# Patient Record
Sex: MALE | Race: WHITE | NOT HISPANIC OR LATINO | URBAN - METROPOLITAN AREA
[De-identification: names, ages, dates, MRNs, and addresses within clinical notes are randomized per-mention and may not be internally consistent; named-entity substitution may affect disease eponyms.]

---

## 2021-01-07 ENCOUNTER — INPATIENT (INPATIENT)
Facility: HOSPITAL | Age: 76
LOS: 0 days | Discharge: HOME | DRG: 287 | End: 2021-01-08
Attending: STUDENT IN AN ORGANIZED HEALTH CARE EDUCATION/TRAINING PROGRAM | Admitting: STUDENT IN AN ORGANIZED HEALTH CARE EDUCATION/TRAINING PROGRAM
Payer: COMMERCIAL

## 2021-01-07 VITALS
RESPIRATION RATE: 18 BRPM | OXYGEN SATURATION: 100 % | TEMPERATURE: 98 F | SYSTOLIC BLOOD PRESSURE: 161 MMHG | WEIGHT: 154.98 LBS | HEART RATE: 67 BPM | HEIGHT: 68 IN | DIASTOLIC BLOOD PRESSURE: 77 MMHG

## 2021-01-07 DIAGNOSIS — E78.5 HYPERLIPIDEMIA, UNSPECIFIED: ICD-10-CM

## 2021-01-07 DIAGNOSIS — N40.0 BENIGN PROSTATIC HYPERPLASIA WITHOUT LOWER URINARY TRACT SYMPTOMS: ICD-10-CM

## 2021-01-07 DIAGNOSIS — R07.9 CHEST PAIN, UNSPECIFIED: ICD-10-CM

## 2021-01-07 DIAGNOSIS — I20.0 UNSTABLE ANGINA: ICD-10-CM

## 2021-01-07 LAB
ALBUMIN SERPL ELPH-MCNC: 4.7 G/DL — SIGNIFICANT CHANGE UP (ref 3.3–5)
ALP SERPL-CCNC: 87 U/L — SIGNIFICANT CHANGE UP (ref 40–120)
ALT FLD-CCNC: 10 U/L — SIGNIFICANT CHANGE UP (ref 10–45)
ANION GAP SERPL CALC-SCNC: 10 MMOL/L — SIGNIFICANT CHANGE UP (ref 5–17)
APTT BLD: 30.2 SEC — SIGNIFICANT CHANGE UP (ref 27.5–35.5)
AST SERPL-CCNC: 21 U/L — SIGNIFICANT CHANGE UP (ref 10–40)
BASOPHILS # BLD AUTO: 0.06 K/UL — SIGNIFICANT CHANGE UP (ref 0–0.2)
BASOPHILS NFR BLD AUTO: 0.9 % — SIGNIFICANT CHANGE UP (ref 0–2)
BILIRUB SERPL-MCNC: 0.7 MG/DL — SIGNIFICANT CHANGE UP (ref 0.2–1.2)
BUN SERPL-MCNC: 16 MG/DL — SIGNIFICANT CHANGE UP (ref 7–23)
CALCIUM SERPL-MCNC: 9.7 MG/DL — SIGNIFICANT CHANGE UP (ref 8.4–10.5)
CHLORIDE SERPL-SCNC: 101 MMOL/L — SIGNIFICANT CHANGE UP (ref 96–108)
CO2 SERPL-SCNC: 28 MMOL/L — SIGNIFICANT CHANGE UP (ref 22–31)
CREAT SERPL-MCNC: 0.87 MG/DL — SIGNIFICANT CHANGE UP (ref 0.5–1.3)
EOSINOPHIL # BLD AUTO: 0.24 K/UL — SIGNIFICANT CHANGE UP (ref 0–0.5)
EOSINOPHIL NFR BLD AUTO: 3.7 % — SIGNIFICANT CHANGE UP (ref 0–6)
GLUCOSE SERPL-MCNC: 114 MG/DL — HIGH (ref 70–99)
HCT VFR BLD CALC: 40.5 % — SIGNIFICANT CHANGE UP (ref 39–50)
HGB BLD-MCNC: 12.9 G/DL — LOW (ref 13–17)
IMM GRANULOCYTES NFR BLD AUTO: 0.3 % — SIGNIFICANT CHANGE UP (ref 0–1.5)
INR BLD: 1.04 RATIO — SIGNIFICANT CHANGE UP (ref 0.88–1.16)
LYMPHOCYTES # BLD AUTO: 1.85 K/UL — SIGNIFICANT CHANGE UP (ref 1–3.3)
LYMPHOCYTES # BLD AUTO: 28.7 % — SIGNIFICANT CHANGE UP (ref 13–44)
MCHC RBC-ENTMCNC: 30.1 PG — SIGNIFICANT CHANGE UP (ref 27–34)
MCHC RBC-ENTMCNC: 31.9 GM/DL — LOW (ref 32–36)
MCV RBC AUTO: 94.6 FL — SIGNIFICANT CHANGE UP (ref 80–100)
MONOCYTES # BLD AUTO: 0.5 K/UL — SIGNIFICANT CHANGE UP (ref 0–0.9)
MONOCYTES NFR BLD AUTO: 7.8 % — SIGNIFICANT CHANGE UP (ref 2–14)
NEUTROPHILS # BLD AUTO: 3.78 K/UL — SIGNIFICANT CHANGE UP (ref 1.8–7.4)
NEUTROPHILS NFR BLD AUTO: 58.6 % — SIGNIFICANT CHANGE UP (ref 43–77)
NRBC # BLD: 0 /100 WBCS — SIGNIFICANT CHANGE UP (ref 0–0)
PLATELET # BLD AUTO: 234 K/UL — SIGNIFICANT CHANGE UP (ref 150–400)
POTASSIUM SERPL-MCNC: 4.4 MMOL/L — SIGNIFICANT CHANGE UP (ref 3.5–5.3)
POTASSIUM SERPL-SCNC: 4.4 MMOL/L — SIGNIFICANT CHANGE UP (ref 3.5–5.3)
PROT SERPL-MCNC: 7.8 G/DL — SIGNIFICANT CHANGE UP (ref 6–8.3)
PROTHROM AB SERPL-ACNC: 12.4 SEC — SIGNIFICANT CHANGE UP (ref 10.6–13.6)
RBC # BLD: 4.28 M/UL — SIGNIFICANT CHANGE UP (ref 4.2–5.8)
RBC # FLD: 12.9 % — SIGNIFICANT CHANGE UP (ref 10.3–14.5)
SARS-COV-2 RNA SPEC QL NAA+PROBE: SIGNIFICANT CHANGE UP
SODIUM SERPL-SCNC: 139 MMOL/L — SIGNIFICANT CHANGE UP (ref 135–145)
TROPONIN T, HIGH SENSITIVITY RESULT: 8 NG/L — SIGNIFICANT CHANGE UP (ref 0–51)
TROPONIN T, HIGH SENSITIVITY RESULT: 9 NG/L — SIGNIFICANT CHANGE UP (ref 0–51)
WBC # BLD: 6.45 K/UL — SIGNIFICANT CHANGE UP (ref 3.8–10.5)
WBC # FLD AUTO: 6.45 K/UL — SIGNIFICANT CHANGE UP (ref 3.8–10.5)

## 2021-01-07 PROCEDURE — 99285 EMERGENCY DEPT VISIT HI MDM: CPT | Mod: GC

## 2021-01-07 PROCEDURE — 93010 ELECTROCARDIOGRAM REPORT: CPT | Mod: GC

## 2021-01-07 PROCEDURE — 71045 X-RAY EXAM CHEST 1 VIEW: CPT | Mod: 26

## 2021-01-07 PROCEDURE — 99223 1ST HOSP IP/OBS HIGH 75: CPT

## 2021-01-07 RX ORDER — ATORVASTATIN CALCIUM 80 MG/1
1 TABLET, FILM COATED ORAL
Qty: 0 | Refills: 0 | DISCHARGE

## 2021-01-07 RX ORDER — METOPROLOL TARTRATE 50 MG
1 TABLET ORAL
Qty: 0 | Refills: 0 | DISCHARGE

## 2021-01-07 RX ORDER — ASPIRIN/CALCIUM CARB/MAGNESIUM 324 MG
1 TABLET ORAL
Qty: 0 | Refills: 0 | DISCHARGE

## 2021-01-07 RX ORDER — ENOXAPARIN SODIUM 100 MG/ML
40 INJECTION SUBCUTANEOUS DAILY
Refills: 0 | Status: DISCONTINUED | OUTPATIENT
Start: 2021-01-07 | End: 2021-01-08

## 2021-01-07 RX ORDER — ATORVASTATIN CALCIUM 80 MG/1
10 TABLET, FILM COATED ORAL AT BEDTIME
Refills: 0 | Status: DISCONTINUED | OUTPATIENT
Start: 2021-01-07 | End: 2021-01-08

## 2021-01-07 RX ORDER — METOPROLOL TARTRATE 50 MG
25 TABLET ORAL DAILY
Refills: 0 | Status: DISCONTINUED | OUTPATIENT
Start: 2021-01-08 | End: 2021-01-08

## 2021-01-07 RX ORDER — TAMSULOSIN HYDROCHLORIDE 0.4 MG/1
0.4 CAPSULE ORAL AT BEDTIME
Refills: 0 | Status: DISCONTINUED | OUTPATIENT
Start: 2021-01-07 | End: 2021-01-08

## 2021-01-07 RX ORDER — ASPIRIN/CALCIUM CARB/MAGNESIUM 324 MG
81 TABLET ORAL DAILY
Refills: 0 | Status: DISCONTINUED | OUTPATIENT
Start: 2021-01-07 | End: 2021-01-08

## 2021-01-07 NOTE — ED ADULT NURSE NOTE - OBJECTIVE STATEMENT
75 year old male presented in w/c from triage states he was told by cardiologist to go to ER last night after falling stress test, Patient A/O x4 hx Hyperlipidemia VSS afebrile, CM in place, skin W/P/M denies chest pain at this time, Lung sounds clear no resp distress, able to move all extremities with no dsitress, no swelling noted

## 2021-01-07 NOTE — ED ADULT NURSE REASSESSMENT NOTE - NS ED NURSE REASSESS COMMENT FT1
Report received from Indira KUHN.  Pt is resting comfortably, no complaints at this time.  VSS, skin w/d/i.  Safety and comfort maintained.  Bed in lowest position, and callbell within reach.  Pending bed upstairs.  Will continue to monitor.

## 2021-01-07 NOTE — ED ADULT NURSE NOTE - CHIEF COMPLAINT QUOTE
mild sob, abnormal stress test yesterday showing blockages), sent in for Cardiac Cath by Cardiologist (MD Winston Escalera - Counts include 234 beds at the [facility])

## 2021-01-07 NOTE — ED PROVIDER NOTE - CLINICAL SUMMARY MEDICAL DECISION MAKING FREE TEXT BOX
[employer]eris-PGY2: 75 year old male with PMH HLD, BPH presents with intermittent chest pain x 1 week. Pt reports intermittent left sided chest pain described as squeezing, radiating to left shoulder, and lasting "a few minutes" when present. Denies any fevers, abdominal pain, nausea, vomiting, diarrhea, leg swelling, numbness, weakness, sweating, lightheadedness, or vision change. Pt states he exercises often, denies any chest pain or shortness of breath with exertion. Pt states he had stress test/echo yesterday with cardiologist Winston Escalera ([facility]) showing "blockage" and was advised to go to the Emergency Department for cardiac catheterization. Denies any complaints at this time and states he feels well. Will obtain labs, imaging, and admission for further evaluation/management.

## 2021-01-07 NOTE — ED ADULT TRIAGE NOTE - CHIEF COMPLAINT QUOTE
mild sob, abnormal stress test yesterday showing blockages), sent in for Cardiac Cath by Cardiologist (MD Winston Escalera - [facility])

## 2021-01-07 NOTE — H&P ADULT - HISTORY OF PRESENT ILLNESS
75yoM w/ PMHx significant for HLD, BPH, who presents w/ complaints of intermittent chest pain (left chest wall radiating to left shoulder, described as "squeezing) over the past week, after being evaluated as O/P by his cardiologist, Dr. Winston Escalera, who performed stress testing yesterday revealing concern for ischemia/blockage, currently w/o complaints of active chest pain, palpitations, SOB/CHOWDHURY, orthopnea, or LE edema, admitted for planned cardiac catheterization. Per the patient symptoms occurs intermittently, but not coinciding with activity, and resolving after a few minutes. He typically exercises often without complaints.     ED Presenting Vitals: 98.1F, 67bpm, 161/77, 18br/m, 100% on RA

## 2021-01-07 NOTE — H&P ADULT - ASSESSMENT
75yoM w/ PMHx significant for HLD, BPH, who presents w/ complaints of intermittent chest pain (left chest wall radiating to left shoulder, described as "squeezing) over the past week, after being evaluated as O/P by his cardiologist, Dr. Winston Escalera, who performed stress testing yesterday revealing concern for ischemia/blockage, currently w/o complaints.

## 2021-01-07 NOTE — ED PROVIDER NOTE - OBJECTIVE STATEMENT
75 year old male with PMH HLD, BPH presents with intermittent chest pain x 1 week. Stress test.     TO BE COMPLETED 75 year old male with PMH HLD, BPH presents with intermittent chest pain x 1 week. Pt reports intermittent left sided chest pain described as squeezing, radiating to left shoulder, and lasting "a few minutes" when present. Denies any fevers, abdominal pain, nausea, vomiting, diarrhea, leg swelling, numbness, weakness, sweating, lightheadedness, or vision change. Pt states he exercises often, denies any chest pain or shortness of breath with exertion. Pt states he had stress test/echo yesterday with cardiologist Winston Escalera ([facility]) showing "blockage" and was advised to go to the Emergency Department for cardiac catheterization. Denies any complaints at this time and states he feels well.

## 2021-01-07 NOTE — ED PROVIDER NOTE - PHYSICAL EXAMINATION
CONSTITUTIONAL: non-toxic, well appearing  SKIN: no rash, no petechiae.  EYES:  pink conjunctiva, anicteric  NECK: Supple; no meningismus, no JVD  CARD: RRR, no murmurs, equal radial pulses bilaterally 2+  RESP: CTAB, no respiratory distress  ABD: Soft, non-tender, non-distended, no peritoneal signs  EXT: Normal ROM x4. No edema. No calf tenderness  NEURO: Alert, oriented. Neuro exam nonfocal.  PSYCH: Cooperative, appropriate.

## 2021-01-07 NOTE — H&P ADULT - NSHPPHYSICALEXAM_GEN_ALL_CORE
Vital Signs Last 24 Hrs  T(F): 97.8 (07 Jan 2021 14:44), Max: 98.1 (07 Jan 2021 10:56)  HR: 62 (07 Jan 2021 15:06) (62 - 70)  BP: 106/67 (07 Jan 2021 15:06) (106/67 - 161/77)  RR: 18 (07 Jan 2021 15:06) (16 - 18)  SpO2: 100% (07 Jan 2021 15:06) (99% - 100%) Vital Signs Last 24 Hrs  T(F): 97.8 (07 Jan 2021 14:44), Max: 98.1 (07 Jan 2021 10:56)  HR: 62 (07 Jan 2021 15:06) (62 - 70)  BP: 106/67 (07 Jan 2021 15:06) (106/67 - 161/77)  RR: 18 (07 Jan 2021 15:06) (16 - 18)  SpO2: 100% (07 Jan 2021 15:06) (99% - 100%)    GEN: elderly man, laying in stretcher in NAD  PSYCH: A&Ox3, mood and affect appear appropriate   SKIN: intact, no e/o rash  NEURO: no focal neurologic deficits appreciated  EYES: PERRL, anicteric  HEAD: NC, AT  NECK: supple  RESPI: no accessory muscle use, B/L air entry, CTAB   CARDIO: regular rate/rhythm, no LE edema B/L  ABD: soft, NT, ND, +BS  EXT: patient able to move all extremities spontaneously  VASC: peripheral pulses palpated

## 2021-01-07 NOTE — H&P ADULT - NEGATIVE ENMT SYMPTOMS
no sinus symptoms/no nasal congestion/no nasal discharge/no nasal obstruction/no throat pain/no dysphagia

## 2021-01-07 NOTE — H&P ADULT - PROBLEM SELECTOR PLAN 1
- currently w/o CP or CP equivalents, ECG w/o VERNELL, hsTrop neg x2  - monitor on telemetry, monitor hemodynamics closely  - if CP returns, please obtain repeat ECG and cardiac enzymes  - per outpatient cardiologist, plan for inpatient cardiac catheterization given abnormal stress testing  - continue home ASA, beta-blocker - currently w/o CP or CP equivalents, ECG w/o VERNELL, hsTrop neg x2  - monitor on telemetry, monitor hemodynamics closely  - if CP returns, please obtain repeat ECG and cardiac enzymes  - per outpatient cardiologist, plan for inpatient cardiac catheterization given abnormal stress testing -- attending of record to contact inpatient cardiology  - continue home ASA, beta-blocker

## 2021-01-07 NOTE — ED ADULT NURSE REASSESSMENT NOTE - NS ED NURSE REASSESS COMMENT FT1
1440: patient ambulatory to bathroom with no distress, VSS CM in place, plan for cath lab tomorrow, plan of care discussed in detail, report given to receiving RN DC

## 2021-01-07 NOTE — H&P ADULT - NSHPLABSRESULTS_GEN_ALL_CORE
Labs and imaging data obtained by the ED personally reviewed.                        12.9   6.45  )-----------( 234      ( 07 Jan 2021 11:47 )             40.5   01-07  139  |  101  |  16  ----------------------------<  114<H>  4.4   |  28  |  0.87    Ca    9.7      07 Jan 2021 11:47    TPro  7.8  /  Alb  4.7  /  TBili  0.7  /  DBili  x   /  AST  21  /  ALT  10  /  AlkPhos  87  01-07    PT/INR - ( 07 Jan 2021 11:47 )   PT: 12.4 sec;   INR: 1.04 ratio     PTT - ( 07 Jan 2021 11:47 )  PTT:30.2 sec    hsTrop = 9-->8    COVID 19 PCR negative    CXR as reported: Clear lungs.    ECG: NSR, QTc 475

## 2021-01-07 NOTE — ED PROVIDER NOTE - NS ED ROS FT
Constitutional: No fever or chills  Eyes: No visual changes  HEENT: No throat pain  CV: Chest pain  Resp: No SOB no cough  GI: No abd pain, nausea or vomiting  : No dysuria  MSK: No musculoskeletal pain  Skin: No rash  Neuro: No headache

## 2021-01-07 NOTE — ED PROVIDER NOTE - ATTENDING CONTRIBUTION TO CARE
75M, hld, bp, with intermittent cp x 1 week, "squeezing," rad to L shoulder, non-exertional, non-pleuritic. Pt was seen by cardiologist, had stress performed and was referred to ED. No active sx currently.    PE: NAD, NCAT, MMM, Trachea midline, Normal conjunctiva, lungs CTAB, S1/S2 RRR, Normal perfusion, 2+ radial pulses bilat, Abdomen Soft, NTND, No rebound/guarding, No LE edema, No deformity of extremities, No rashes,  No focal motor or sensory deficits.     Will check labs. EKG. Admit for further cardiac w/u. - Jose Lee MD

## 2021-01-08 ENCOUNTER — TRANSCRIPTION ENCOUNTER (OUTPATIENT)
Age: 76
End: 2021-01-08

## 2021-01-08 VITALS
RESPIRATION RATE: 18 BRPM | HEART RATE: 73 BPM | OXYGEN SATURATION: 95 % | DIASTOLIC BLOOD PRESSURE: 61 MMHG | SYSTOLIC BLOOD PRESSURE: 115 MMHG | TEMPERATURE: 98 F

## 2021-01-08 DIAGNOSIS — R07.9 CHEST PAIN, UNSPECIFIED: ICD-10-CM

## 2021-01-08 LAB
HCV AB S/CO SERPL IA: 0.07 S/CO — SIGNIFICANT CHANGE UP (ref 0–0.99)
HCV AB SERPL-IMP: SIGNIFICANT CHANGE UP

## 2021-01-08 PROCEDURE — 99152 MOD SED SAME PHYS/QHP 5/>YRS: CPT

## 2021-01-08 PROCEDURE — 93456 R HRT CORONARY ARTERY ANGIO: CPT | Mod: 26

## 2021-01-08 RX ORDER — TAMSULOSIN HYDROCHLORIDE 0.4 MG/1
1 CAPSULE ORAL
Qty: 0 | Refills: 0 | DISCHARGE

## 2021-01-08 RX ADMIN — ATORVASTATIN CALCIUM 10 MILLIGRAM(S): 80 TABLET, FILM COATED ORAL at 09:25

## 2021-01-08 RX ADMIN — Medication 81 MILLIGRAM(S): at 09:26

## 2021-01-08 RX ADMIN — TAMSULOSIN HYDROCHLORIDE 0.4 MILLIGRAM(S): 0.4 CAPSULE ORAL at 09:25

## 2021-01-08 RX ADMIN — Medication 25 MILLIGRAM(S): at 05:42

## 2021-01-08 NOTE — DISCHARGE NOTE PROVIDER - CARE PROVIDER_API CALL
Norris Noyola ()  Internal Medicine  [address] [place] [address], [facility] [address]  [place] [address]  Phone: [phone]  Fax: [phone]  Follow Up Time:

## 2021-01-08 NOTE — DISCHARGE NOTE PROVIDER - NSDCCPCAREPLAN_GEN_ALL_CORE_FT
PRINCIPAL DISCHARGE DIAGNOSIS  Diagnosis: Chest pain  Assessment and Plan of Treatment:   HOME CARE INSTRUCTIONS  For the next few days, avoid physical activities that bring on chest pain. Continue physical activities as directed.  Do not smoke.  Avoid drinking alcohol.   Only take over-the-counter or prescription medicine for pain, discomfort, or fever as directed by your caregiver.  Follow your caregiver's suggestions for further testing if your chest pain does not go away.  Keep any follow-up appointments you made. If you do not go to an appointment, you could develop lasting (chronic) problems with pain. If there is any problem keeping an appointment, you must call to reschedule.   SEEK MEDICAL CARE IF:  You think you are having problems from the medicine you are taking. Read your medicine instructions carefully.  Your chest pain does not go away, even after treatment.  You develop a rash with blisters on your chest.  SEEK IMMEDIATE MEDICAL CARE IF:  You have increased chest pain or pain that spreads to your arm, neck, jaw, back, or abdomen.   You develop shortness of breath, an increasing cough, or you are coughing up blood.  You have severe back or abdominal pain, feel nauseous, or vomit.  You develop severe weakness, fainting, or chills.  You have a fever.  THIS IS AN EMERGENCY. Do not wait to see if the pain will go away. Get medical help at once. Call your local emergency services (_____________________). Do not drive yourself to the hospital.        SECONDARY DISCHARGE DIAGNOSES  Diagnosis: HLD (hyperlipidemia)  Assessment and Plan of Treatment: c/w Atorvastatin

## 2021-01-08 NOTE — PROGRESS NOTE ADULT - PROBLEM SELECTOR PLAN 1
currently w/o CP or CP equivalents, ECG w/o VERNELL, hsTrop neg x2  monitor on telemetry, monitor hemodynamics closely  if CP returns, please obtain repeat ECG and cardiac enzymes  plan for inpatient cardiac catheterization given abnormal stress testing  continue home ASA, beta-blocker  appreciate cardio recs

## 2021-01-08 NOTE — CONSULT NOTE ADULT - PROBLEM SELECTOR RECOMMENDATION 9
-  -rule out significant cad   -abnormal stress test as outpatient  -cardiac cath today  -aspirin, lipitor, toprol xl as ordered    please obtain stress test report from outpatient cardiologist.    Norris Noyola D.O.  [phone]

## 2021-01-08 NOTE — CONSULT NOTE ADULT - SUBJECTIVE AND OBJECTIVE BOX
[facility] Cardiology Consult  _________________________    Patient is a 75y old  Male who presents with a chief complaint of CP, outpatient positive stress testing (07 Jan 2021 15:41)      HPI:  75yoM w/ PMHx significant for HLD, BPH, who presents w/ complaints of intermittent chest pain (left chest wall radiating to left shoulder, described as "squeezing) over the past week, after being evaluated as O/P by his cardiologist, Dr. Winston Escalera, who performed stress testing yesterday revealing concern for ischemia/blockage, currently w/o complaints of active chest pain, palpitations, SOB/CHOWDHURY, orthopnea, or LE edema, admitted for planned cardiac catheterization. Per the patient symptoms occurs intermittently, but not coinciding with activity, and resolving after a few minutes. He typically exercises often without complaints.       PAST MEDICAL & SURGICAL HISTORY:  BPH (benign prostatic hyperplasia)    HLD (hyperlipidemia)    No significant past surgical history        MEDICATIONS  (STANDING):  aspirin  chewable 81 milliGRAM(s) Oral daily  atorvastatin 10 milliGRAM(s) Oral at bedtime  enoxaparin Injectable 40 milliGRAM(s) SubCutaneous daily  metoprolol succinate ER 25 milliGRAM(s) Oral daily  tamsulosin 0.4 milliGRAM(s) Oral at bedtime    MEDICATIONS  (PRN):      Allergies    No Known Allergies    Intolerances        Social Histroy: Tobacco- , ETOH-, Illicit Drugs-    T(C): 36.8 (01-08-21 @ 04:40), Max: 36.8 (01-08-21 @ 04:40)  HR: 61 (01-08-21 @ 04:40) (60 - 70)  BP: 116/69 (01-08-21 @ 04:40) (106/67 - 161/77)  RR: 18 (01-08-21 @ 04:40) (15 - 18)  SpO2: 98% (01-08-21 @ 04:40) (95% - 100%)  I&O's Summary    07 Jan 2021 07:01  -  08 Jan 2021 07:00  --------------------------------------------------------  IN: 230 mL / OUT: 0 mL / NET: 230 mL        Review of Systems:  Constitutional: [ ] Fever [ ] Chills [ ] Fatigue [ ] Weight change   HEENT: [ ] Blurred vision [ ] Eye Pain [ ] Headache [ ] Runny nose [ ] Sore Throat   Respiratory: [ ] Cough [ ] Wheezing [ ] Shortness of breath  Cardiovascular: [x ] Chest Pain [ ] Palpitations [ ] CHOWDHURY [ ] PND [ ] Orthopnea  Gastrointestinal: [ ] Abdominal Pain [ ] Diarrhea [ ] Constipation [ ] Hemorrhoids [ ] Nausea [ ] Vomiting  Genitourinary: [ ] Nocturia [ ] Dysuria [ ] Incontinence  Extremities: [ ] Swelling [ ] Joint Pain  Neurologic: [ ] Focal deficit [ ] Paresthesias [ ] Syncope  Lymphatic: [ ] Swelling [ ] Lymphadenopathy   Skin: [ ] Rash [ ] Ecchymoses [ ] Wounds [ ] Lesions  Psychiatry: [ ] Depression [ ] Suicidal/Homicidal Ideation [ ] Anxiety [ ] Sleep Disturbances  [x ] 10 point review of systems is otherwise negative except as mentioned above            [ ]Unable to obtain    PHYSICAL EXAM:  GENERAL: Alert, NAD  NECK: Supple  CHEST/LUNG: Clear to auscultation bilaterally; No wheezes, rales, or rhonchi  HEART: S1 S2 normal, RRR,  No murmurs, rubs, or gallops  ABDOMEN: Soft, Nondistended  EXTREMITIES:  No LE edema.      LABS:                        12.9   6.45  )-----------( 234      ( 07 Jan 2021 11:47 )             40.5     01-07    139  |  101  |  16  ----------------------------<  114<H>  4.4   |  28  |  0.87    Ca    9.7      07 Jan 2021 11:47    TPro  7.8  /  Alb  4.7  /  TBili  0.7  /  DBili  x   /  AST  21  /  ALT  10  /  AlkPhos  87  01-07    PT/INR - ( 07 Jan 2021 11:47 )   PT: 12.4 sec;   INR: 1.04 ratio         PTT - ( 07 Jan 2021 11:47 )  PTT:30.2 sec              MEDICATIONS  (STANDING):  aspirin  chewable 81 milliGRAM(s) Oral daily  atorvastatin 10 milliGRAM(s) Oral at bedtime  enoxaparin Injectable 40 milliGRAM(s) SubCutaneous daily  metoprolol succinate ER 25 milliGRAM(s) Oral daily  tamsulosin 0.4 milliGRAM(s) Oral at bedtime    MEDICATIONS  (PRN):      Troponin T, High Sensitivity Result: 8 ng/L (01-07-21 @ 13:21)  Troponin T, High Sensitivity Result: 9 ng/L (01-07-21 @ 11:47)      RADIOLOGY & ADDITIONAL TESTS:    Cardiology testing:  EKG: sinus 69 bpm, pvc  Telemetry: sinus

## 2021-01-08 NOTE — DISCHARGE NOTE PROVIDER - HOSPITAL COURSE
75yoM w/ PMHx significant for HLD, BPH, who presents w/ complaint of intermittent chest pain  cath  with diffuse calcification LAD 30%  will d/c pt  home on ASA , Lipitor  and Toprol xl

## 2021-01-08 NOTE — PROGRESS NOTE ADULT - SUBJECTIVE AND OBJECTIVE BOX
Patient is a 75y old  Male who presents with a chief complaint of CP, outpatient positive stress testing (08 Jan 2021 09:25)      SUBJECTIVE / OVERNIGHT EVENTS:    Patient seen and examined. denies active cp. some dizziness.      Vital Signs Last 24 Hrs  T(C): 36.8 (08 Jan 2021 04:40), Max: 36.8 (08 Jan 2021 04:40)  T(F): 98.3 (08 Jan 2021 04:40), Max: 98.3 (08 Jan 2021 04:40)  HR: 61 (08 Jan 2021 04:40) (60 - 70)  BP: 116/69 (08 Jan 2021 04:40) (106/67 - 142/70)  BP(mean): --  RR: 18 (08 Jan 2021 04:40) (15 - 18)  SpO2: 98% (08 Jan 2021 04:40) (95% - 100%)  I&O's Summary    07 Jan 2021 07:01  -  08 Jan 2021 07:00  --------------------------------------------------------  IN: 230 mL / OUT: 0 mL / NET: 230 mL    08 Jan 2021 07:01  -  08 Jan 2021 11:42  --------------------------------------------------------  IN: 240 mL / OUT: 300 mL / NET: -60 mL        PE:  GENERAL: NAD, AAOx3  HEAD:  Atraumatic, Normocephalic  CHEST/LUNG: CTABL, No wheeze  HEART: Regular rate and rhythm; no murmur  ABDOMEN: Soft, Nontender, Nondistended; Bowel sounds present  EXTREMITIES:  2+ Peripheral Pulses, No clubbing, cyanosis, or edema  SKIN: No rashes or lesions  NEURO: No focal deficits    LABS:                        12.9   6.45  )-----------( 234      ( 07 Jan 2021 11:47 )             40.5     01-07    139  |  101  |  16  ----------------------------<  114<H>  4.4   |  28  |  0.87    Ca    9.7      07 Jan 2021 11:47    TPro  7.8  /  Alb  4.7  /  TBili  0.7  /  DBili  x   /  AST  21  /  ALT  10  /  AlkPhos  87  01-07    PT/INR - ( 07 Jan 2021 11:47 )   PT: 12.4 sec;   INR: 1.04 ratio         PTT - ( 07 Jan 2021 11:47 )  PTT:30.2 sec  CAPILLARY BLOOD GLUCOSE                RADIOLOGY & ADDITIONAL TESTS:    Imaging Personally Reviewed:  [x] YES  [ ] NO    Consultant(s) Notes Reviewed:  [x] YES  [ ] NO    MEDICATIONS  (STANDING):  aspirin  chewable 81 milliGRAM(s) Oral daily  atorvastatin 10 milliGRAM(s) Oral at bedtime  enoxaparin Injectable 40 milliGRAM(s) SubCutaneous daily  metoprolol succinate ER 25 milliGRAM(s) Oral daily  tamsulosin 0.4 milliGRAM(s) Oral at bedtime    MEDICATIONS  (PRN):      Care Discussed with Consultants/Other Providers [x] YES  [ ] NO    HEALTH ISSUES - PROBLEM Dx:  Chest pain, unspecified type  Chest pain, unspecified type    BPH (benign prostatic hyperplasia)  BPH (benign prostatic hyperplasia)    HLD (hyperlipidemia)  HLD (hyperlipidemia)    Unstable angina  Unstable angina

## 2021-01-08 NOTE — DISCHARGE NOTE PROVIDER - NSDCMRMEDTOKEN_GEN_ALL_CORE_FT
Today we ordered blood work to look at his hemoglobin levels as well as some basic kidney and liver function. Orders given, you can get these done at any time.    Referral given for behavioral health to help with depression. Continue to see  at school in the mean time. In the future, you may be a good candidate for medications in addition to therapy.     For weight loss, try to eat more nutrient dense foods. Try to cut back on sweets. Try things like power bars, high protein and high fat foods like olive oil, avocados, and peanut butter. The weight loss and increased fatigue are likely related to depression and will likely improve with treatment of the depression. Keep working on it!   aspirin 81 mg oral tablet: 1 tab(s) orally  atorvastatin 10 mg oral tablet: 1 tab(s) orally once a day  metoprolol succinate 25 mg oral tablet, extended release: 1 tab(s) orally once a day  tamsulosin 0.4 mg oral capsule: 1 cap(s) orally once a day   aspirin 81 mg oral tablet: 1 tab(s) orally  atorvastatin 10 mg oral tablet: 1 tab(s) orally once a day  metoprolol succinate 25 mg oral tablet, extended release: 1 tab(s) orally once a day

## 2021-01-08 NOTE — DISCHARGE NOTE NURSING/CASE MANAGEMENT/SOCIAL WORK - PATIENT PORTAL LINK FT
You can access the FollowMyHealth Patient Portal offered by [facility] by registering at the following website: [email or web address]. By joining [facility]’s FollowMyHealth portal, you will also be able to view your health information using other applications (apps) compatible with our system.

## 2021-01-20 PROCEDURE — U0005: CPT

## 2021-01-20 PROCEDURE — C1769: CPT

## 2021-01-20 PROCEDURE — 99285 EMERGENCY DEPT VISIT HI MDM: CPT | Mod: 25

## 2021-01-20 PROCEDURE — C1887: CPT

## 2021-01-20 PROCEDURE — 85730 THROMBOPLASTIN TIME PARTIAL: CPT

## 2021-01-20 PROCEDURE — 85025 COMPLETE CBC W/AUTO DIFF WBC: CPT

## 2021-01-20 PROCEDURE — 85610 PROTHROMBIN TIME: CPT

## 2021-01-20 PROCEDURE — 99152 MOD SED SAME PHYS/QHP 5/>YRS: CPT

## 2021-01-20 PROCEDURE — 87635 SARS-COV-2 COVID-19 AMP PRB: CPT

## 2021-01-20 PROCEDURE — 93005 ELECTROCARDIOGRAM TRACING: CPT

## 2021-01-20 PROCEDURE — C1894: CPT

## 2021-01-20 PROCEDURE — 71045 X-RAY EXAM CHEST 1 VIEW: CPT

## 2021-01-20 PROCEDURE — 86803 HEPATITIS C AB TEST: CPT

## 2021-01-20 PROCEDURE — 84484 ASSAY OF TROPONIN QUANT: CPT

## 2021-01-20 PROCEDURE — 80053 COMPREHEN METABOLIC PANEL: CPT

## 2021-01-20 PROCEDURE — 93458 L HRT ARTERY/VENTRICLE ANGIO: CPT

## 2022-11-22 PROBLEM — E78.5 HYPERLIPIDEMIA, UNSPECIFIED: Chronic | Status: ACTIVE | Noted: 2021-01-07

## 2022-11-22 PROBLEM — N40.0 BENIGN PROSTATIC HYPERPLASIA WITHOUT LOWER URINARY TRACT SYMPTOMS: Chronic | Status: ACTIVE | Noted: 2021-01-07

## 2022-11-22 PROBLEM — Z00.00 ENCOUNTER FOR PREVENTIVE HEALTH EXAMINATION: Status: ACTIVE | Noted: 2022-11-22

## 2022-12-19 ENCOUNTER — APPOINTMENT (OUTPATIENT)
Dept: UROLOGY | Facility: CLINIC | Age: 77
End: 2022-12-19

## 2022-12-19 VITALS
WEIGHT: 154 LBS | DIASTOLIC BLOOD PRESSURE: 72 MMHG | BODY MASS INDEX: 23.34 KG/M2 | TEMPERATURE: 98 F | HEART RATE: 59 BPM | HEIGHT: 68 IN | SYSTOLIC BLOOD PRESSURE: 148 MMHG

## 2022-12-19 DIAGNOSIS — Z86.79 PERSONAL HISTORY OF OTHER DISEASES OF THE CIRCULATORY SYSTEM: ICD-10-CM

## 2022-12-19 DIAGNOSIS — Z78.9 OTHER SPECIFIED HEALTH STATUS: ICD-10-CM

## 2022-12-19 DIAGNOSIS — Z87.438 PERSONAL HISTORY OF OTHER DISEASES OF MALE GENITAL ORGANS: ICD-10-CM

## 2022-12-19 DIAGNOSIS — Z86.39 PERSONAL HISTORY OF OTHER ENDOCRINE, NUTRITIONAL AND METABOLIC DISEASE: ICD-10-CM

## 2022-12-19 PROCEDURE — 99203 OFFICE O/P NEW LOW 30 MIN: CPT

## 2022-12-19 RX ORDER — TAMSULOSIN HYDROCHLORIDE 0.4 MG/1
0.4 CAPSULE ORAL
Refills: 0 | Status: ACTIVE | COMMUNITY

## 2022-12-19 RX ORDER — METOPROLOL SUCCINATE 25 MG/1
25 TABLET, EXTENDED RELEASE ORAL
Refills: 0 | Status: ACTIVE | COMMUNITY

## 2022-12-19 RX ORDER — UBIQUINOL 100 MG
CAPSULE ORAL
Refills: 0 | Status: ACTIVE | COMMUNITY

## 2022-12-19 RX ORDER — ATORVASTATIN CALCIUM 20 MG/1
20 TABLET, FILM COATED ORAL
Refills: 0 | Status: ACTIVE | COMMUNITY

## 2022-12-19 NOTE — ASSESSMENT
[FreeTextEntry1] : 77 year old Male with exam and history consistent with BPH with LUTS. \par The anatomy of the lower genitourinary tract was explained to the patient, with the urine passing through the bladder neck and prostatic urethra as it exits the body. Prostatic enlargement can constrict the lumen and the bladder outlet, causing incomplete bladder emptying and irritative symptoms such and frequency and urgency. Alpha blockers can be used to relax the bladder neck and facilitate passage of urine from the bladder. 5-alpha reductase inhibitors can be employed to shrink the prostate and thereby allow for passage of urine more easily. Anticholinergics to decrease bladder irritative symptoms were also discussed, but it was stressed that they can increase post void residual and risk urinary retention. Will increase tamsulosin to 0.8 mg. \par \par Pt also with urinary urgency. Discussed that he may need OAB treatments if BPH treatments alone do not improve urgency. Pt is going to [place] for winter. Will give samples of myrbetriq to take if increasing tamsulosin does not improve his sx.

## 2022-12-19 NOTE — HISTORY OF PRESENT ILLNESS
[FreeTextEntry1] : 77 year old man seen 12/19/2022 with complaint of frequency, urgency, hesitancy, weak stream, double voiding and nocturia 1-3x/night. For BPH, he is on tamsulosin 0.4 mg, which has only slightly improved his symptoms. This began 20 years ago, but become worse and more bothersome over the past 2 years. He reports urgency with occasional UUI. No family history contributory to BPH or urinary urgency.

## 2022-12-20 LAB
APPEARANCE: CLEAR
BACTERIA: NEGATIVE
BILIRUBIN URINE: NEGATIVE
BLOOD URINE: NEGATIVE
COLOR: COLORLESS
GLUCOSE QUALITATIVE U: NEGATIVE
HYALINE CASTS: 0 /LPF
KETONES URINE: NEGATIVE
LEUKOCYTE ESTERASE URINE: NEGATIVE
MICROSCOPIC-UA: NORMAL
NITRITE URINE: NEGATIVE
PH URINE: 7
PROTEIN URINE: NEGATIVE
RED BLOOD CELLS URINE: 0 /HPF
SPECIFIC GRAVITY URINE: 1
SQUAMOUS EPITHELIAL CELLS: 0 /HPF
UROBILINOGEN URINE: NORMAL
WHITE BLOOD CELLS URINE: 0 /HPF

## 2022-12-21 LAB — BACTERIA UR CULT: NORMAL

## 2023-06-16 ENCOUNTER — APPOINTMENT (OUTPATIENT)
Dept: UROLOGY | Facility: CLINIC | Age: 78
End: 2023-06-16
Payer: MEDICARE

## 2023-06-16 VITALS
DIASTOLIC BLOOD PRESSURE: 72 MMHG | SYSTOLIC BLOOD PRESSURE: 131 MMHG | HEART RATE: 58 BPM | WEIGHT: 154 LBS | BODY MASS INDEX: 23.34 KG/M2 | HEIGHT: 68 IN

## 2023-06-16 PROCEDURE — 99213 OFFICE O/P EST LOW 20 MIN: CPT

## 2023-06-16 NOTE — ASSESSMENT
[FreeTextEntry1] : 79 yo M with BPH with LUTS, OAB symptoms. Discussed given that he has both obstructive and irritative symptoms, can consider BPH treatment or OAB treatments. Alternatively, can consider UDS to better characterize underlying problem and guide therapy. He chooses the latter. RTO for UDS.

## 2023-06-16 NOTE — HISTORY OF PRESENT ILLNESS
[FreeTextEntry1] : 77 year old man seen 12/19/2022 with complaint of frequency, urgency, hesitancy, weak stream, double voiding and nocturia 1-3x/night. For BPH, he is on tamsulosin 0.4 mg, which has only slightly improved his symptoms. This began 20 years ago, but become worse and more bothersome over the past 2 years. He reports urgency with occasional UUI. No family history contributory to BPH or urinary urgency. \par \par 06/16/2023: Patient presents for follow up. He reports LUTS did not improve further with increase of tamsulosin. He still reports frequency, urgency, and weak stream. No dysuria, no hematuria.

## 2023-08-01 ENCOUNTER — APPOINTMENT (OUTPATIENT)
Dept: UROLOGY | Facility: CLINIC | Age: 78
End: 2023-08-01

## 2023-08-14 NOTE — ED PROVIDER NOTE - DISPOSITION TYPE
Prescription approved per [facility] Refill Protocol.  Michelle FIGUEROA RN     ADMIT

## 2023-09-28 ENCOUNTER — APPOINTMENT (OUTPATIENT)
Dept: UROLOGY | Facility: CLINIC | Age: 78
End: 2023-09-28
Payer: MEDICARE

## 2023-09-28 VITALS
SYSTOLIC BLOOD PRESSURE: 120 MMHG | WEIGHT: 154 LBS | HEIGHT: 68 IN | BODY MASS INDEX: 23.34 KG/M2 | DIASTOLIC BLOOD PRESSURE: 70 MMHG

## 2023-09-28 PROCEDURE — 51798 US URINE CAPACITY MEASURE: CPT

## 2023-09-28 PROCEDURE — 51728 CYSTOMETROGRAM W/VP: CPT

## 2023-09-28 PROCEDURE — 51784 ANAL/URINARY MUSCLE STUDY: CPT

## 2023-09-28 PROCEDURE — 51797 INTRAABDOMINAL PRESSURE TEST: CPT

## 2023-09-29 RX ORDER — MIRABEGRON 50 MG/1
50 TABLET, FILM COATED, EXTENDED RELEASE ORAL
Qty: 30 | Refills: 11 | Status: ACTIVE | COMMUNITY
Start: 2023-09-29 | End: 1900-01-01

## 2023-10-03 DIAGNOSIS — I10 ESSENTIAL (PRIMARY) HYPERTENSION: ICD-10-CM

## 2023-10-03 RX ORDER — VIBEGRON 75 MG/1
75 TABLET, FILM COATED ORAL DAILY
Qty: 21 | Refills: 0 | Status: ACTIVE | OUTPATIENT
Start: 2023-10-03

## 2023-12-21 ENCOUNTER — APPOINTMENT (OUTPATIENT)
Dept: UROLOGY | Facility: CLINIC | Age: 78
End: 2023-12-21
Payer: MEDICARE

## 2023-12-21 VITALS
DIASTOLIC BLOOD PRESSURE: 77 MMHG | WEIGHT: 154 LBS | SYSTOLIC BLOOD PRESSURE: 133 MMHG | BODY MASS INDEX: 23.34 KG/M2 | HEIGHT: 68 IN

## 2023-12-21 DIAGNOSIS — N13.8 BENIGN PROSTATIC HYPERPLASIA WITH LOWER URINARY TRACT SYMPMS: ICD-10-CM

## 2023-12-21 DIAGNOSIS — N40.1 BENIGN PROSTATIC HYPERPLASIA WITH LOWER URINARY TRACT SYMPMS: ICD-10-CM

## 2023-12-21 DIAGNOSIS — R39.15 URGENCY OF URINATION: ICD-10-CM

## 2023-12-21 PROCEDURE — 99213 OFFICE O/P EST LOW 20 MIN: CPT

## 2023-12-21 NOTE — ASSESSMENT
[FreeTextEntry1] : 79 yo with BPH and OAB. For BPH, continue tamsulosin For OAB, continue Gemtesa. RTO in 3-6 months for sx check

## 2023-12-21 NOTE — HISTORY OF PRESENT ILLNESS
[FreeTextEntry1] : 77 year old man seen 12/19/2022 with complaint of frequency, urgency, hesitancy, weak stream, double voiding and nocturia 1-3x/night. For BPH, he is on tamsulosin 0.4 mg, which has only slightly improved his symptoms. This began 20 years ago, but become worse and more bothersome over the past 2 years. He reports urgency with occasional UUI. No family history contributory to BPH or urinary urgency.   06/16/2023: Patient presents for follow up. He reports LUTS did not improve further with increase of tamsulosin. He still reports frequency, urgency, and weak stream. No dysuria, no hematuria.   12/21/2023: Patient presents for follow up. He reports improvement of urgency, nocturia "by 80%. " Feels he is voiding well, thinks stream is stronger on Gemtesa.

## 2024-04-26 RX ORDER — VIBEGRON 75 MG/1
75 TABLET, FILM COATED ORAL
Qty: 90 | Refills: 2 | Status: ACTIVE | COMMUNITY
Start: 2023-09-28 | End: 1900-01-01

## 2024-06-19 ENCOUNTER — RX RENEWAL (OUTPATIENT)
Age: 79
End: 2024-06-19

## 2024-06-19 RX ORDER — TAMSULOSIN HYDROCHLORIDE 0.4 MG/1
0.4 CAPSULE ORAL
Qty: 180 | Refills: 0 | Status: ACTIVE | COMMUNITY
Start: 2022-12-19 | End: 1900-01-01

## 2024-09-09 ENCOUNTER — APPOINTMENT (OUTPATIENT)
Dept: UROLOGY | Facility: CLINIC | Age: 79
End: 2024-09-09
Payer: MEDICARE

## 2024-09-09 VITALS
WEIGHT: 154 LBS | SYSTOLIC BLOOD PRESSURE: 135 MMHG | DIASTOLIC BLOOD PRESSURE: 73 MMHG | BODY MASS INDEX: 23.34 KG/M2 | HEIGHT: 68 IN

## 2024-09-09 DIAGNOSIS — N40.1 BENIGN PROSTATIC HYPERPLASIA WITH LOWER URINARY TRACT SYMPMS: ICD-10-CM

## 2024-09-09 DIAGNOSIS — N13.8 BENIGN PROSTATIC HYPERPLASIA WITH LOWER URINARY TRACT SYMPMS: ICD-10-CM

## 2024-09-09 DIAGNOSIS — R39.15 URGENCY OF URINATION: ICD-10-CM

## 2024-09-09 DIAGNOSIS — R31.0 GROSS HEMATURIA: ICD-10-CM

## 2024-09-09 PROCEDURE — 99213 OFFICE O/P EST LOW 20 MIN: CPT

## 2024-09-09 RX ORDER — METHYLPREDNISOLONE 4 MG/1
4 TABLET ORAL
Qty: 21 | Refills: 0 | Status: COMPLETED | COMMUNITY
Start: 2024-04-03

## 2024-09-09 RX ORDER — AMOXICILLIN AND CLAVULANATE POTASSIUM 875; 125 MG/1; MG/1
875-125 TABLET, COATED ORAL
Qty: 20 | Refills: 0 | Status: COMPLETED | COMMUNITY
Start: 2024-04-03

## 2024-09-09 NOTE — ASSESSMENT
[FreeTextEntry1] : 80 yo with BPH and OAB. For BPH, continue tamsulosin For OAB, continue Gemtesa. RTO in 3-6 months for sx check  For gross hematuria, We discussed that the differential diagnosis includes both benign and malignant conditions including renal stones, BPH, urinary tract infections, and cancer of the bladder or ureter or kidney. Cystoscopy was recommended to rule out pathology in the bladder. CT Urogram was recommended to evaluate for presence of nephroureteral stones or malignancies. Urinalysis and urine culture were recommended to check for urinary tract infection. Pt agrees and understands.

## 2024-09-10 LAB
APPEARANCE: CLEAR
BACTERIA: NEGATIVE /HPF
BILIRUBIN URINE: NEGATIVE
BLOOD URINE: NEGATIVE
CAST: 0 /LPF
COLOR: YELLOW
EPITHELIAL CELLS: 0 /HPF
GLUCOSE QUALITATIVE U: NEGATIVE MG/DL
KETONES URINE: NEGATIVE MG/DL
LEUKOCYTE ESTERASE URINE: NEGATIVE
MICROSCOPIC-UA: NORMAL
NITRITE URINE: NEGATIVE
PH URINE: 7
PROTEIN URINE: NEGATIVE MG/DL
RED BLOOD CELLS URINE: 0 /HPF
SPECIFIC GRAVITY URINE: 1.01
UROBILINOGEN URINE: 0.2 MG/DL
WHITE BLOOD CELLS URINE: 0 /HPF

## 2024-09-11 LAB — BACTERIA UR CULT: NORMAL

## 2024-09-16 ENCOUNTER — RX RENEWAL (OUTPATIENT)
Age: 79
End: 2024-09-16

## 2024-09-24 ENCOUNTER — APPOINTMENT (OUTPATIENT)
Dept: CT IMAGING | Facility: CLINIC | Age: 79
End: 2024-09-24
Payer: MEDICARE

## 2024-09-24 ENCOUNTER — OUTPATIENT (OUTPATIENT)
Dept: OUTPATIENT SERVICES | Facility: HOSPITAL | Age: 79
LOS: 1 days | End: 2024-09-24
Payer: MEDICARE

## 2024-09-24 DIAGNOSIS — R31.0 GROSS HEMATURIA: ICD-10-CM

## 2024-09-24 PROCEDURE — 74178 CT ABD&PLV WO CNTR FLWD CNTR: CPT | Mod: 26,MH

## 2024-09-24 PROCEDURE — 74178 CT ABD&PLV WO CNTR FLWD CNTR: CPT

## 2024-09-30 ENCOUNTER — APPOINTMENT (OUTPATIENT)
Dept: UROLOGY | Facility: CLINIC | Age: 79
End: 2024-09-30
Payer: MEDICARE

## 2024-09-30 VITALS
BODY MASS INDEX: 22.66 KG/M2 | HEIGHT: 69 IN | DIASTOLIC BLOOD PRESSURE: 76 MMHG | WEIGHT: 153 LBS | SYSTOLIC BLOOD PRESSURE: 133 MMHG | RESPIRATION RATE: 16 BRPM | HEART RATE: 58 BPM

## 2024-09-30 DIAGNOSIS — N13.8 BENIGN PROSTATIC HYPERPLASIA WITH LOWER URINARY TRACT SYMPMS: ICD-10-CM

## 2024-09-30 DIAGNOSIS — N40.1 BENIGN PROSTATIC HYPERPLASIA WITH LOWER URINARY TRACT SYMPMS: ICD-10-CM

## 2024-09-30 LAB
BILIRUB UR QL STRIP: NORMAL
CLARITY UR: NORMAL
COLLECTION METHOD: NORMAL
GLUCOSE UR-MCNC: NORMAL
HCG UR QL: 0.2 EU/DL
HGB UR QL STRIP.AUTO: NORMAL
KETONES UR-MCNC: NORMAL
LEUKOCYTE ESTERASE UR QL STRIP: NORMAL
NITRITE UR QL STRIP: NORMAL
PH UR STRIP: 6
PROT UR STRIP-MCNC: NORMAL
SP GR UR STRIP: 1.01

## 2024-09-30 PROCEDURE — 52000 CYSTOURETHROSCOPY: CPT

## 2024-09-30 PROCEDURE — 81003 URINALYSIS AUTO W/O SCOPE: CPT | Mod: QW

## 2024-12-03 NOTE — H&P ADULT - MUSCULOSKELETAL
Flecainide      Last Written Prescription Date:  09/03/2024  Last Fill Quantity: 180,   # refills: 0  Last Office Visit: 06/22/22  Future Office visit:         negative

## 2024-12-12 ENCOUNTER — RX RENEWAL (OUTPATIENT)
Age: 79
End: 2024-12-12

## 2025-03-14 ENCOUNTER — RX RENEWAL (OUTPATIENT)
Age: 80
End: 2025-03-14

## 2025-06-10 ENCOUNTER — RX RENEWAL (OUTPATIENT)
Age: 80
End: 2025-06-10

## 2025-08-25 ENCOUNTER — APPOINTMENT (OUTPATIENT)
Dept: UROLOGY | Facility: CLINIC | Age: 80
End: 2025-08-25
Payer: MEDICARE

## 2025-08-25 VITALS
RESPIRATION RATE: 16 BRPM | OXYGEN SATURATION: 97 % | HEART RATE: 46 BPM | WEIGHT: 154 LBS | BODY MASS INDEX: 23.34 KG/M2 | HEIGHT: 68 IN

## 2025-08-25 DIAGNOSIS — N40.1 BENIGN PROSTATIC HYPERPLASIA WITH LOWER URINARY TRACT SYMPMS: ICD-10-CM

## 2025-08-25 DIAGNOSIS — N32.81 OVERACTIVE BLADDER: ICD-10-CM

## 2025-08-25 DIAGNOSIS — N13.8 BENIGN PROSTATIC HYPERPLASIA WITH LOWER URINARY TRACT SYMPMS: ICD-10-CM

## 2025-08-25 PROCEDURE — 99214 OFFICE O/P EST MOD 30 MIN: CPT

## 2025-08-25 PROCEDURE — G2211 COMPLEX E/M VISIT ADD ON: CPT

## 2025-08-25 RX ORDER — MIRABEGRON 50 MG/1
50 TABLET, EXTENDED RELEASE ORAL
Qty: 90 | Refills: 3 | Status: ACTIVE | COMMUNITY
Start: 2025-08-25 | End: 1900-01-01

## 2025-09-05 RX ORDER — TROSPIUM CHLORIDE 60 MG/1
60 CAPSULE, EXTENDED RELEASE ORAL
Qty: 90 | Refills: 3 | Status: ACTIVE | COMMUNITY
Start: 2025-09-05 | End: 1900-01-01